# Patient Record
Sex: FEMALE | Race: WHITE | URBAN - METROPOLITAN AREA
[De-identification: names, ages, dates, MRNs, and addresses within clinical notes are randomized per-mention and may not be internally consistent; named-entity substitution may affect disease eponyms.]

---

## 2019-03-11 ENCOUNTER — TELEPHONE (OUTPATIENT)
Dept: TRANSPLANT | Facility: CLINIC | Age: 24
End: 2019-03-11

## 2019-03-11 NOTE — TELEPHONE ENCOUNTER
"[facility] B[facility]  [id number]      LIVING KIDNEY DONOR EVALUATION  Donor First Name Alivia Donor MRN    Donor Last Name Gianni Completed 3/8/2019 1:51 PM    1995 Record ID [id number]   BREEZE Screen PASSED     Intended Recipient  Recipient First Name Stephanie Recipient MRN    Recipient Last Name Audrey Relationship Cousin   Recipient  2006-11-15 Recipient Diagnosis    Recipient's ABO      Donor Information  Age 23 Gender Female   Ht 168 cm (5' 6'') Race    Wt 61.2 kg (135 lbs) Ethnicity Not /   BMI 21.80 kg/m  Preferred Language English      Required No     Blood Type O   Demographics  Home Address [address] [place] [address] # [phone]   [facility] Type Mobile   State [facility] #    Zip Code [address] Type    Country [place] Preferred Contact day    Email [email or web address] Preferred Contact time    &&   Donor's Medical Information  Medical History Allergic Rhinitis   Anxiety d/o NOS   Depression   Insomnia Medications Multivitamin   Trazodone   Zoloft   Zyrtec   Surgical History Oral Surgery, NOS   Tonsillectomy and/or Adenoidectomy Allergies NKDA   Social History EtOH: Rare (1-2 drinks/year)   Illicit Drug Use: Denies   Tobacco: Denies Self-Reported Functional Status \"I am able to participate in strenuous sports such as swimming, singles tennis, football, basketball, or skiing\"   Family Medical History Cancer (denies)   Diabetes (denies)   Heart Disease (denies)   Hypertension (denies)   Kidney Disease (denies)   Kidney Stones (denies) Exercise Frequency Exercise (>3X per week)   Review of Organ Systems  Review of Systems Airway or Lungs: No   Blood Disorder: No   Cancer: No   Diabetes,Thyroid,Adrenal,Endocrine Disorder: No   Digestive or Liver: No   Female Health: No   Heart or Circulatory System: No   Immune Diseases: No   Kidneys and Bladder: No   Muscles,Bones,Joints: No   Neuro: No   Psych: Yes   &&   Donor's Social " Information  Marital Status Single Living Accommodation Lives in rented accommodation   Level of Education College or baccalaureate degree complete Living Arrangement Alone   Employment Status Full Time Concerns: health and life insurance No   Employer [employer] Concerns: job security and lost income No   Occupation      Medical Insurance Status Has medical insurance     High Risk Behavior  High Risk Behaviors Blood transfusion < 12 months. (NO)   Commercial sex < 12 months. (NO)   Illicit IV drug use < 5yrs. (NO)   Other high risk sexual contact < 12 months. (NO)   Reason for Donation  Referral Friend or Family of Tx Candidate Reason for Donation I am a healthy young adult with no comorbidities. My mental health is currently very stable. I know my family would do the same for me if I was in this position! This is a bright young child who has her whole life ahead of her and I can help!   Permission to Disclose Inquiry Yes Patient Comments I am a very healthy young adult, my blood type is O negative.   Donor Motivation Level Highly motivated donor     PCP Contact  PCP Name Brenda Bridger APRN, CNP   PCP [facility]   PCP Phone [phone]   Emergency Contact  First Name Mariaelena First Name Keenan   Last Name Spoden Last Name Spoden   Phone # [phone] Phone # [phone]   Phone Type Mobile Phone Type Mobile   Relationship Mother Relationship Father   Office Use  Reviewed By    Reviewed 3/11/2019 2:32 PM   Admin Folder Archive   Comments 3/11/2019 eval passed   3/11/2019 Archived pkt sent   Lost for Followup    Extended Comments    [facility]ZE ID [email or web address]:[id number] survey status completed   Activity History  Call  Task    Due Date 3/11/2019   Last Modified Date/Time 3/11/2019 2:16 PM   Comments

## 2019-03-12 DIAGNOSIS — Z00.5 TRANSPLANT DONOR EVALUATION: Primary | ICD-10-CM

## 2019-03-15 ENCOUNTER — DOCUMENTATION ONLY (OUTPATIENT)
Dept: NEPHROLOGY | Facility: CLINIC | Age: 24
End: 2019-03-15

## 2019-03-19 ENCOUNTER — TELEPHONE (OUTPATIENT)
Dept: NEPHROLOGY | Facility: CLINIC | Age: 24
End: 2019-03-19

## 2019-03-19 DIAGNOSIS — Z00.5 TRANSPLANT DONOR EVALUATION: Primary | ICD-10-CM

## 2019-03-20 ENCOUNTER — DOCUMENTATION ONLY (OUTPATIENT)
Dept: TRANSPLANT | Facility: CLINIC | Age: 24
End: 2019-03-20

## 2019-03-20 NOTE — PROGRESS NOTES
Per request;  Sent out buccal swab kit via [employer] [id number] [phone], tracming back to Immunology [id number] [phone].

## 2019-03-25 DIAGNOSIS — Z00.5 TRANSPLANT DONOR EVALUATION: ICD-10-CM

## 2019-03-26 LAB
A* LOCUS: NORMAL
A*: NORMAL
ABTEST METHOD: NORMAL
B* LOCUS: NORMAL
B*: NORMAL
BW-1: NORMAL
C* LOCUS: NORMAL
DPA1* LOCUS NMDP: NORMAL
DPA1* NMDP: NORMAL
DPA1*: NORMAL
DPA1*LOCUS: NORMAL
DPB1* LOCUS NMDP: NORMAL
DPB1* NMDP: NORMAL
DPB1*: NORMAL
DPB1*LOCUS: NORMAL
DQA1*: NORMAL
DQA1*LOCUS: NORMAL
DQB1* LOCUS: NORMAL
DQB1*: NORMAL
DRB1* LOCUS: NORMAL
DRB1*: NORMAL
DRB3* LOCUS: NORMAL
DRB4*: NORMAL
DRSSO TEST METHOD: NORMAL